# Patient Record
Sex: FEMALE | Race: WHITE | NOT HISPANIC OR LATINO | URBAN - METROPOLITAN AREA
[De-identification: names, ages, dates, MRNs, and addresses within clinical notes are randomized per-mention and may not be internally consistent; named-entity substitution may affect disease eponyms.]

---

## 2017-04-18 NOTE — PATIENT DISCUSSION
Considering  Surgery Counseling: I have discussed the option of scheduling surgery versus following, as well as the risks, benefits and alternatives of cataract surgery with the patient. It was explained that the surgery is elective, there is no rush and there is no harm in waiting to have surgery. It was also explained that there is no guarantee that removing the cataract will improve their vision. The patient understands and desires to follow for now and will consider his/her options.

## 2017-04-18 NOTE — PATIENT DISCUSSION
Artificial Tears: 2-3 times per day use one drop of Refresh Celluvisc 2-3 times a day into each eye. This drop is available OVER-THE-COUNTER unless specified otherwise.

## 2017-04-18 NOTE — PATIENT DISCUSSION
Continue: GenTeal  Mild to Moderate (artificial tear (hypromellose)): drops: 0.3% as needed into both eyes

## 2017-04-18 NOTE — PATIENT DISCUSSION
Continue: Refresh Celluvisc (carboxymethylcellulose sodium): dropperette,gel: 1% 1 drop twice a day as needed into both eyes

## 2017-04-18 NOTE — PATIENT DISCUSSION
CONJUNCTIVITIS (ALLERGIC), OU:  PRESCRIBE Zaditor TID OU - PRN itching    RETURN FOR FOLLOW-UP AS SCHEDULED.

## 2017-11-16 NOTE — PATIENT DISCUSSION
Much discussion about antibiotic drops and patient's allergies. Patient will  drops today and try the antibiotic drop (ofloxacin) to see if she has any trouble with this drop. If she does have a problem she will call us and we can call in Vancomycin to a compounding pharmacy. Much discussion about where she wants her vision to be after surgery. Patient has never been nearsighted and would most likely not be the best fit.

## 2017-11-16 NOTE — PATIENT DISCUSSION
Continue: Soothe XP (light mineral oil-mineral oil): drops: 1-4.5% 1 drop twice a week into both eyes

## 2017-11-16 NOTE — PATIENT DISCUSSION
New Prescription: prednisolone acetate (prednisolone acetate): drops,suspension: 1% 1 drop four times a day as directed into affected eye 11-

## 2017-11-16 NOTE — PATIENT DISCUSSION
Continue: GenTeal  Mild to Moderate (artificial tear (hypromellose)): drops: 0.3% 1 drop twice a week as needed into both eyes

## 2017-11-16 NOTE — PATIENT DISCUSSION
CATARACTS, OU - VISUALLY SIGNIFICANT. SCHEDULE OS EYE FIRST THEN LATER IN THE OD EYE IF VISUAL SYMPTOMS PERSIST.

## 2017-11-16 NOTE — PATIENT DISCUSSION
New Prescription: ketorolac (ketorolac): drops: 0.5% 1 drop four times a day as directed into affected eye 11-

## 2017-11-16 NOTE — PATIENT DISCUSSION
LEFT EYE CATARACT SURGERY - PRE-OP INSTRUCTIONS:  I have reviewed the indications, alternatives, and risks of the procedure with the patient. These risks include partial or total loss of vision, infection in the eye, pain, and the need for additional surgery for complications including a broken posterior capsule, inability to completely remove the cataract, dropped nucleus, infection, or retinal detachment. The patient has given informed consent by signing the standard consent form. We will proceed with cataract surgery as planned. The patient has also been instructed in the usual pre-operative regimen including the need to use the antibiotic (Ocuflox) drops 4 times a day beginning 3 days before surgery, and the need to avoid eating or drinking anything except prescription medications after midnight on the morning of surgery. The patient was given the pre-printed Cataract Surgery Instructions' handout. The contents were carefully reviewed with the patient.

## 2017-11-16 NOTE — PATIENT DISCUSSION
Continue: Refresh Celluvisc (carboxymethylcellulose sodium): dropperette,gel: 1% 1 drop twice a week as needed into both eyes

## 2017-11-16 NOTE — PATIENT DISCUSSION
New Prescription: ofloxacin (ofloxacin): drops: 0.3% 1 drop four times a day as directed into affected eye 11-

## 2017-12-05 NOTE — PATIENT DISCUSSION
she had high blood pressure, heart rate, terrible shaking and many side effects in the surgery center - Dr. Natalia Garcia will review with [facility]

## 2017-12-05 NOTE — PATIENT DISCUSSION
Continue: ketorolac (ketorolac): drops: 0.5% 1 drop four times a day as directed into affected eye 11-

## 2017-12-05 NOTE — PATIENT DISCUSSION
Continue: ofloxacin (ofloxacin): drops: 0.3% 1 drop four times a day as directed into affected eye 11-

## 2017-12-05 NOTE — PATIENT DISCUSSION
Patient requests dilation drops without epi - explained RUBIA in order for dilation and limit complications

## 2017-12-05 NOTE — PATIENT DISCUSSION
Continue: prednisolone acetate (prednisolone acetate): drops,suspension: 1% 1 drop four times a day as directed into affected eye 11-

## 2017-12-20 NOTE — PATIENT DISCUSSION
Continue: Refresh Celluvisc (carboxymethylcellulose sodium): dropperette,gel: 1% 1 drop as needed into both eyes

## 2017-12-20 NOTE — PATIENT DISCUSSION
Continue: prednisolone acetate (prednisolone acetate): drops,suspension: 1% 1 drop three times a day as directed into left eye 11-

## 2018-01-30 NOTE — PATIENT DISCUSSION
Reviewed all visit notes with patient and her friend, agreed light sensitivity occured after patient stopped post-op drops due to other complaints she was having/foreign body sensation

## 2018-01-30 NOTE — PATIENT DISCUSSION
Education regarding cataract surgery.   Patient to call at any point if she has other complaints or concerns

## 2018-02-13 NOTE — PATIENT DISCUSSION
Per patient request will reach out to Dr. Manjeet Fine at the [facility] in [place] - [phone] regarding this patient

## 2018-02-13 NOTE — PATIENT DISCUSSION
Much discussion regarding photophobia - recommend discussion with primary care doctor as well, following routine, uncomplicated cataract surgery

## 2018-09-07 ENCOUNTER — IMPORTED ENCOUNTER (OUTPATIENT)
Dept: URBAN - METROPOLITAN AREA CLINIC 9 | Facility: CLINIC | Age: 83
End: 2018-09-07

## 2019-07-08 ENCOUNTER — IMPORTED ENCOUNTER (OUTPATIENT)
Dept: URBAN - METROPOLITAN AREA CLINIC 9 | Facility: CLINIC | Age: 84
End: 2019-07-08

## 2020-01-06 ENCOUNTER — IMPORTED ENCOUNTER (OUTPATIENT)
Dept: URBAN - METROPOLITAN AREA CLINIC 9 | Facility: CLINIC | Age: 85
End: 2020-01-06

## 2020-02-03 ENCOUNTER — IMPORTED ENCOUNTER (OUTPATIENT)
Dept: URBAN - METROPOLITAN AREA CLINIC 9 | Facility: CLINIC | Age: 85
End: 2020-02-03

## 2020-11-23 ENCOUNTER — IMPORTED ENCOUNTER (OUTPATIENT)
Dept: URBAN - METROPOLITAN AREA CLINIC 9 | Facility: CLINIC | Age: 85
End: 2020-11-23

## 2021-02-08 ENCOUNTER — IMPORTED ENCOUNTER (OUTPATIENT)
Dept: URBAN - METROPOLITAN AREA CLINIC 9 | Facility: CLINIC | Age: 86
End: 2021-02-08

## 2021-05-13 ENCOUNTER — IMPORTED ENCOUNTER (OUTPATIENT)
Dept: URBAN - METROPOLITAN AREA CLINIC 9 | Facility: CLINIC | Age: 86
End: 2021-05-13

## 2021-05-13 PROBLEM — H04.123: Noted: 2021-05-13

## 2021-05-13 PROBLEM — H35.3131: Noted: 2021-05-13

## 2021-10-16 ASSESSMENT — VISUAL ACUITY
OD_CC: 20/30 - SN
OD_CC: 20/25 - SN
OD_SC: 20/40 SN
OD_SC: 20/40 SN
OS_CC: 20/30 - SN
OD_CC: 20/30 - SN
OD_CC: 20/25 - SN
OS_CC: 20/40 - SN
OS_CC: 20/40 SN
OS_SC: 20/50 SN
OD_CC: 20/30 -2 SN
OD_CC: 20/30 - SN
OS_CC: 20/40 SN
OD_CC: 20/30 -2 SN
OS_CC: 20/30 - SN
OS_SC: 20/50 - SN
OS_CC: 20/30 - SN

## 2021-10-16 ASSESSMENT — TONOMETRY
OD_IOP_MMHG: 12
OS_IOP_MMHG: 9
OS_IOP_MMHG: 13
OS_IOP_MMHG: 15
OD_IOP_MMHG: 12
OD_IOP_MMHG: 15
OD_IOP_MMHG: 10
OD_IOP_MMHG: 8
OS_IOP_MMHG: 12
OD_IOP_MMHG: 10
OS_IOP_MMHG: 14
OS_IOP_MMHG: 12

## 2022-02-07 ENCOUNTER — ESTABLISHED PATIENT (OUTPATIENT)
Dept: URBAN - METROPOLITAN AREA CLINIC 4 | Facility: CLINIC | Age: 87
End: 2022-02-07

## 2022-02-07 DIAGNOSIS — G43.B0: ICD-10-CM

## 2022-02-07 DIAGNOSIS — G90.2: ICD-10-CM

## 2022-02-07 PROCEDURE — 99214 OFFICE O/P EST MOD 30 MIN: CPT

## 2022-02-07 ASSESSMENT — TONOMETRY
OS_IOP_MMHG: 20
OD_IOP_MMHG: 16

## 2022-02-07 ASSESSMENT — VISUAL ACUITY
OD_CC: 20/25
OS_CC: 20/30

## 2022-10-10 ENCOUNTER — ESTABLISHED PATIENT (OUTPATIENT)
Dept: URBAN - METROPOLITAN AREA CLINIC 4 | Facility: CLINIC | Age: 87
End: 2022-10-10

## 2022-10-10 DIAGNOSIS — H35.3131: ICD-10-CM

## 2022-10-10 DIAGNOSIS — G90.2: ICD-10-CM

## 2022-10-10 DIAGNOSIS — G43.B0: ICD-10-CM

## 2022-10-10 PROCEDURE — 99214 OFFICE O/P EST MOD 30 MIN: CPT

## 2022-10-10 PROCEDURE — 92134 CPTRZ OPH DX IMG PST SGM RTA: CPT

## 2022-10-10 ASSESSMENT — TONOMETRY
OS_IOP_MMHG: 14
OD_IOP_MMHG: 14

## 2023-06-30 ENCOUNTER — ESTABLISHED PATIENT (OUTPATIENT)
Dept: URBAN - METROPOLITAN AREA CLINIC 4 | Facility: CLINIC | Age: 88
End: 2023-06-30

## 2023-06-30 DIAGNOSIS — H43.813: ICD-10-CM

## 2023-06-30 DIAGNOSIS — H35.3221: ICD-10-CM

## 2023-06-30 PROCEDURE — 92134 CPTRZ OPH DX IMG PST SGM RTA: CPT

## 2023-06-30 PROCEDURE — 92250 FUNDUS PHOTOGRAPHY W/I&R: CPT

## 2023-06-30 PROCEDURE — 99214 OFFICE O/P EST MOD 30 MIN: CPT

## 2023-06-30 ASSESSMENT — VISUAL ACUITY
OS_CC: 20/50-1
OD_CC: 20/25-2

## 2023-06-30 ASSESSMENT — TONOMETRY
OS_IOP_MMHG: 16
OD_IOP_MMHG: 17

## 2023-07-03 ENCOUNTER — ESTABLISHED PATIENT (OUTPATIENT)
Dept: URBAN - METROPOLITAN AREA CLINIC 18 | Facility: CLINIC | Age: 88
End: 2023-07-03

## 2023-07-03 DIAGNOSIS — H35.3221: ICD-10-CM

## 2023-07-03 DIAGNOSIS — H43.813: ICD-10-CM

## 2023-07-03 DIAGNOSIS — H35.361: ICD-10-CM

## 2023-07-03 DIAGNOSIS — G90.2: ICD-10-CM

## 2023-07-03 PROCEDURE — 92201 OPSCPY EXTND RTA DRAW UNI/BI: CPT

## 2023-07-03 PROCEDURE — 99214 OFFICE O/P EST MOD 30 MIN: CPT

## 2023-07-03 PROCEDURE — 92134 CPTRZ OPH DX IMG PST SGM RTA: CPT

## 2023-07-03 PROCEDURE — 67028 INJECTION EYE DRUG: CPT

## 2023-07-03 ASSESSMENT — VISUAL ACUITY
OS_PH: 20/60
OD_CC: 20/25
OS_CC: 20/80+1

## 2023-07-03 ASSESSMENT — TONOMETRY
OD_IOP_MMHG: 13
OS_IOP_MMHG: 15

## 2023-08-08 ENCOUNTER — ESTABLISHED PATIENT (OUTPATIENT)
Dept: URBAN - METROPOLITAN AREA CLINIC 18 | Facility: CLINIC | Age: 88
End: 2023-08-08

## 2023-08-08 DIAGNOSIS — H35.3221: ICD-10-CM

## 2023-08-08 DIAGNOSIS — H35.361: ICD-10-CM

## 2023-08-08 PROCEDURE — 92134 CPTRZ OPH DX IMG PST SGM RTA: CPT

## 2023-08-08 PROCEDURE — 67028 INJECTION EYE DRUG: CPT

## 2023-08-08 ASSESSMENT — TONOMETRY
OD_IOP_MMHG: 13
OS_IOP_MMHG: 14

## 2023-08-08 ASSESSMENT — VISUAL ACUITY
OD_CC: 20/25
OS_CC: 20/40

## 2023-09-05 ENCOUNTER — ESTABLISHED PATIENT (OUTPATIENT)
Dept: URBAN - METROPOLITAN AREA CLINIC 18 | Facility: CLINIC | Age: 88
End: 2023-09-05

## 2023-09-05 DIAGNOSIS — H35.361: ICD-10-CM

## 2023-09-05 DIAGNOSIS — H35.3221: ICD-10-CM

## 2023-09-05 PROCEDURE — 92134 CPTRZ OPH DX IMG PST SGM RTA: CPT

## 2023-09-05 PROCEDURE — 67028 INJECTION EYE DRUG: CPT

## 2023-09-05 ASSESSMENT — VISUAL ACUITY
OD_CC: 20/25-1
OS_CC: 20/30-2

## 2023-09-05 ASSESSMENT — TONOMETRY
OS_IOP_MMHG: 19
OD_IOP_MMHG: 20

## 2023-10-10 ENCOUNTER — CLINIC PROCEDURE ONLY (OUTPATIENT)
Dept: URBAN - METROPOLITAN AREA CLINIC 18 | Facility: CLINIC | Age: 88
End: 2023-10-10

## 2023-10-10 DIAGNOSIS — H35.361: ICD-10-CM

## 2023-10-10 DIAGNOSIS — H35.3221: ICD-10-CM

## 2023-10-10 PROCEDURE — 67028 INJECTION EYE DRUG: CPT | Mod: 25,LT

## 2023-10-10 PROCEDURE — 92134 CPTRZ OPH DX IMG PST SGM RTA: CPT

## 2023-10-10 ASSESSMENT — VISUAL ACUITY
OS_CC: 20/30-2
OD_CC: 20/30-2

## 2023-10-10 ASSESSMENT — TONOMETRY
OS_IOP_MMHG: 19
OD_IOP_MMHG: 20

## 2023-11-21 ENCOUNTER — CLINIC PROCEDURE ONLY (OUTPATIENT)
Dept: URBAN - METROPOLITAN AREA CLINIC 18 | Facility: CLINIC | Age: 88
End: 2023-11-21

## 2023-11-21 DIAGNOSIS — H35.3221: ICD-10-CM

## 2023-11-21 DIAGNOSIS — H35.361: ICD-10-CM

## 2023-11-21 PROCEDURE — 67028 INJECTION EYE DRUG: CPT

## 2023-11-21 PROCEDURE — 92134 CPTRZ OPH DX IMG PST SGM RTA: CPT

## 2023-11-21 ASSESSMENT — TONOMETRY
OS_IOP_MMHG: 16
OD_IOP_MMHG: 15

## 2023-11-21 ASSESSMENT — VISUAL ACUITY
OD_CC: 20/25-1
OS_CC: 20/40+2

## 2024-01-16 ENCOUNTER — CLINIC PROCEDURE ONLY (OUTPATIENT)
Dept: URBAN - METROPOLITAN AREA CLINIC 18 | Facility: CLINIC | Age: 89
End: 2024-01-16

## 2024-01-16 DIAGNOSIS — H35.361: ICD-10-CM

## 2024-01-16 DIAGNOSIS — H35.3231: ICD-10-CM

## 2024-01-16 PROCEDURE — 67028 INJECTION EYE DRUG: CPT

## 2024-01-16 PROCEDURE — 92134 CPTRZ OPH DX IMG PST SGM RTA: CPT

## 2024-01-16 ASSESSMENT — VISUAL ACUITY
OD_CC: 20/25
OS_CC: 20/30-2
OU_CC: 20/25

## 2024-01-16 ASSESSMENT — TONOMETRY
OD_IOP_MMHG: 19
OS_IOP_MMHG: 19

## 2024-03-26 ENCOUNTER — CLINIC PROCEDURE ONLY (OUTPATIENT)
Dept: URBAN - METROPOLITAN AREA CLINIC 18 | Facility: CLINIC | Age: 89
End: 2024-03-26

## 2024-03-26 DIAGNOSIS — H35.361: ICD-10-CM

## 2024-03-26 DIAGNOSIS — H35.3231: ICD-10-CM

## 2024-03-26 PROCEDURE — 92134 CPTRZ OPH DX IMG PST SGM RTA: CPT

## 2024-03-26 ASSESSMENT — VISUAL ACUITY
OD_SC: 20/30-2
OS_SC: 20/40-1

## 2024-03-26 ASSESSMENT — TONOMETRY
OS_IOP_MMHG: 16
OD_IOP_MMHG: 16

## 2024-04-16 ENCOUNTER — CLINIC PROCEDURE ONLY (OUTPATIENT)
Dept: URBAN - METROPOLITAN AREA CLINIC 18 | Facility: CLINIC | Age: 89
End: 2024-04-16

## 2024-04-16 DIAGNOSIS — H35.3231: ICD-10-CM

## 2024-04-16 PROCEDURE — 92134 CPTRZ OPH DX IMG PST SGM RTA: CPT

## 2024-04-16 PROCEDURE — 67028 INJECTION EYE DRUG: CPT

## 2024-04-16 ASSESSMENT — VISUAL ACUITY
OD_CC: 20/30
OS_CC: 20/50-2

## 2024-04-16 ASSESSMENT — TONOMETRY
OS_IOP_MMHG: 10
OD_IOP_MMHG: 10

## 2024-05-16 ENCOUNTER — ESTABLISHED PATIENT (OUTPATIENT)
Facility: LOCATION | Age: 89
End: 2024-05-16

## 2024-05-16 DIAGNOSIS — H35.361: ICD-10-CM

## 2024-05-16 DIAGNOSIS — H35.3231: ICD-10-CM

## 2024-05-16 DIAGNOSIS — H43.813: ICD-10-CM

## 2024-05-16 DIAGNOSIS — H35.032: ICD-10-CM

## 2024-05-16 DIAGNOSIS — G90.2: ICD-10-CM

## 2024-05-16 PROCEDURE — 92015 DETERMINE REFRACTIVE STATE: CPT

## 2024-05-16 PROCEDURE — 92134 CPTRZ OPH DX IMG PST SGM RTA: CPT

## 2024-05-16 PROCEDURE — 92014 COMPRE OPH EXAM EST PT 1/>: CPT

## 2024-05-16 ASSESSMENT — KERATOMETRY
OS_AXISANGLE2_DEGREES: 12
OD_AXISANGLE2_DEGREES: 120
OS_K1POWER_DIOPTERS: 43.00
OD_AXISANGLE_DEGREES: 30
OD_K2POWER_DIOPTERS: 43.75
OS_K2POWER_DIOPTERS: 44.50
OD_K1POWER_DIOPTERS: 43.50
OS_AXISANGLE_DEGREES: 102

## 2024-05-16 ASSESSMENT — VISUAL ACUITY
OS_SC: 20/40-2
OD_CC: 20/40+1
OU_SC: 20/30-1
OD_SC: 20/30-2
OS_CC: 20/40
OU_CC: 20/30-1

## 2024-05-16 ASSESSMENT — TONOMETRY
OD_IOP_MMHG: 14
OS_IOP_MMHG: 14